# Patient Record
Sex: FEMALE | Race: WHITE | Employment: OTHER | URBAN - METROPOLITAN AREA
[De-identification: names, ages, dates, MRNs, and addresses within clinical notes are randomized per-mention and may not be internally consistent; named-entity substitution may affect disease eponyms.]

---

## 2021-04-26 ENCOUNTER — APPOINTMENT (OUTPATIENT)
Dept: GENERAL RADIOLOGY | Facility: CLINIC | Age: 51
End: 2021-04-26
Attending: EMERGENCY MEDICINE
Payer: COMMERCIAL

## 2021-04-26 ENCOUNTER — HOSPITAL ENCOUNTER (EMERGENCY)
Facility: CLINIC | Age: 51
Discharge: HOME | End: 2021-04-26
Attending: EMERGENCY MEDICINE | Admitting: EMERGENCY MEDICINE
Payer: COMMERCIAL

## 2021-04-26 VITALS
OXYGEN SATURATION: 98 % | SYSTOLIC BLOOD PRESSURE: 109 MMHG | RESPIRATION RATE: 18 BRPM | DIASTOLIC BLOOD PRESSURE: 78 MMHG | HEART RATE: 75 BPM | TEMPERATURE: 97.5 F

## 2021-04-26 DIAGNOSIS — U07.1 2019 NOVEL CORONAVIRUS DISEASE (COVID-19): ICD-10-CM

## 2021-04-26 DIAGNOSIS — J18.9 PNEUMONIA OF RIGHT LOWER LOBE DUE TO INFECTIOUS ORGANISM: ICD-10-CM

## 2021-04-26 PROCEDURE — 99283 EMERGENCY DEPT VISIT LOW MDM: CPT | Mod: 25

## 2021-04-26 PROCEDURE — 71045 X-RAY EXAM CHEST 1 VIEW: CPT

## 2021-04-26 RX ORDER — AZITHROMYCIN 250 MG/1
TABLET, FILM COATED ORAL
Qty: 6 TABLET | Refills: 0 | Status: SHIPPED | OUTPATIENT
Start: 2021-04-26 | End: 2021-05-01

## 2021-04-26 ASSESSMENT — ENCOUNTER SYMPTOMS
DIARRHEA: 1
SHORTNESS OF BREATH: 1
WHEEZING: 0
FATIGUE: 1
FEVER: 1
CARDIOVASCULAR NEGATIVE: 1
CHILLS: 1
NAUSEA: 1
VOMITING: 0
COUGH: 1
APPETITE CHANGE: 1

## 2021-04-26 NOTE — ED PROVIDER NOTES
History   Chief Complaint:  Cough, Generalized Weakness, COVID+    HPI   Nohemy Marie is a 50 year old female with complaints of cough, shortness of breath, and generalized weakness and anorexia.  She was diagnosed with COVID-19 on April 16 and reports that she has essentially been in bed since then with the symptoms.  She notes no true worsening of symptoms but is concerned they have not begun to improve.  She has had nausea and occasional loose nonbloody stool.  She denies vomiting, chest pain, or abdominal pain.  She is a non-smoker.    Review of Systems   Constitutional: Positive for appetite change, chills, fatigue and fever.   Respiratory: Positive for cough and shortness of breath. Negative for wheezing.    Cardiovascular: Negative.    Gastrointestinal: Positive for diarrhea and nausea. Negative for vomiting.   All other systems reviewed and are negative.      Allergies:  The patient has no known allergies.     Medications:  Sertraline  Wellbutrin  Valtrex    Past Medical History:    Anxiety   Depression  Breast mass   Sleep apnea  Mixed stress and urge urinary incontinence  Nontoxic uninodular goiter  High risk pregnancy     Past Surgical History:    D&C  Partial thyroidectomy  TMJ surgery   [place] teeth extraction   Hysterectomy  Bilateral salpingectomy  Vaginal prolapse repair  ToT sling    Family History:    Heart disease - father, brother  Hypertension - father, mother  Stroke - father  Breast cancer - mother, maternal-side cousin, maternal-side aunt  Depression - mother   Diabetes - mother     Social History:  Presents to the ED: alone   Tobacco use: non-smoker  PCP: Mirela Saavedra    Physical Exam     Patient Vitals for the past 24 hrs:   BP Temp Pulse Resp SpO2   04/26/21 1615 -- -- -- -- 98 %   04/26/21 1600 95/85 -- 80 -- 94 %   04/26/21 1535 109/82 -- 70 -- 96 %   04/26/21 1336 106/73 97.5  F (36.4  C) 98 18 95 %       Physical Exam  General: Patient is alert and cooperative.  HENT:  Normal  appearance, wearing mask.   Eyes: EOMI. Normal conjunctiva.  Neck:  Normal range of motion and appearance.   Cardiovascular:  Normal rate, regular rhythm and normal heart sounds.   Pulmonary/Chest:  Effort normal. No wheezing or crackles.  Abdominal: Soft. No distension or tenderness.     Musculoskeletal: Normal range of motion. No edema or tenderness.   Neurological: oriented, normal strength, sensation, and coordination.   Skin: Warm and dry. No rash or bruising.   Psychiatric: Normal mood and affect. Normal behavior and judgement.    Emergency Department Course     Imaging:  XR Chest Port 1 View  Heart size is normal. Minimal linear opacity in the   inferior left lung likely atelectasis or scar formation. Subtle patchy   opacity inferolateral right lung suspicious for early pneumonia. No   pleural effusion, pneumothorax, or abnormal area of consolidation.     Reading per radiology.    Emergency Department Course:    Reviewed:  I reviewed the patient's nursing notes, vitals and past medical history.     Assessments:  1520 I performed an exam of the patient in room ED01 as documented above.  1613 I updated the patient on results and discussed plan of care.    Disposition:  The patient was discharged to home.     Impression & Plan     CMS Diagnoses: None    Medical Decision Making:  Nohemy Marie is a 50 year old female who presents with shortness of breath, cough, and generalized weakness concerning for pneumonia in the setting of a positive COVID-19 infection. Vital signs are reassuring and the patient is not hypoxic. History, physical exam and imaging studies are consistent with pneumonia, likely secondary to the patient's COVID-19 diagnosis. The patient's work of breathing is normal. Mentation is normal and they are afebrile. There is no leukocytosis. There are no signs of complications of pneumonia at this point such as septic shock, bacteremia, empyema, hypoxia, respiratory failure of compromise. At this  time, I believe the patient does not meet criteria for hospitalization. I doubt PE, dissection, acute coronary syndrome or other worrisome etiology for patient's symptoms.   CXR shows right basilar infiltrate; will start azithromycin.      The patient was given the current [facility] guidelines on self-isolation.They are asked to follow-up with primary clinician to discuss current state of health. COVID-19 [facility] referral was made for him. They are asked to drink plenty of fluids and asked to return if they develop severe difficulty in breathing or worsening symptoms. All questions answered. Patient discharged to home in stable condition.     Covid-19  Nohemy Marie was evaluated during a global COVID-19 pandemic, which necessitated consideration that the patient might be at risk for infection with the SARS-CoV-2 virus that causes COVID-19.   Applicable protocols for evaluation were followed during the patient's care.   COVID-19 was considered as part of the patient's evaluation. The plan for testing is:  a POSITIVE test was obtained at a previous visit which was reviewed & considered today.    Diagnosis:    ICD-10-CM    1. 2019 novel coronavirus disease (COVID-19)  U07.1 COVID-19 [facility] Referral   2. Pneumonia of right lower lobe due to infectious organism  J18.9      Discharge Medications:   New Prescriptions    AZITHROMYCIN (ZITHROMAX) 250 MG TABLET    Take 2 tablets (500 mg) by mouth daily for 1 day, THEN 1 tablet (250 mg) daily for 4 days.     Scribe Disclosure:  I, Chastity Trippaisha, am serving as a scribe at 3:46 PM on 4/26/2021 to document services personally performed by Keith Diaz MD based on my observations and the provider's statements to me.    This note was completed in part using Dragon voice recognition software. Although reviewed after completion, some word and grammatical errors may occur.      Keith Diaz MD  04/26/21 8550